# Patient Record
Sex: MALE | Race: WHITE | URBAN - METROPOLITAN AREA
[De-identification: names, ages, dates, MRNs, and addresses within clinical notes are randomized per-mention and may not be internally consistent; named-entity substitution may affect disease eponyms.]

---

## 2019-01-17 ENCOUNTER — HOSPITAL ENCOUNTER (EMERGENCY)
Age: 39
Discharge: STILL ADMITTED OR ADMINISTRATIVE | End: 2019-01-17

## 2021-04-12 ENCOUNTER — WALK IN (OUTPATIENT)
Dept: URGENT CARE | Age: 41
End: 2021-04-12

## 2021-04-12 VITALS
RESPIRATION RATE: 18 BRPM | HEART RATE: 106 BPM | OXYGEN SATURATION: 98 % | SYSTOLIC BLOOD PRESSURE: 177 MMHG | TEMPERATURE: 97 F | DIASTOLIC BLOOD PRESSURE: 116 MMHG

## 2021-04-12 DIAGNOSIS — Z20.828 CONTACT W AND EXPOSURE TO OTH VIRAL COMMUNICABLE DISEASES: ICD-10-CM

## 2021-04-12 DIAGNOSIS — R43.2 LOSS OF TASTE: Primary | ICD-10-CM

## 2021-04-12 LAB — SARS-COV+SARS-COV-2 AG RESP QL IA.RAPID: NOT DETECTED

## 2021-04-12 PROCEDURE — 87426 SARSCOV CORONAVIRUS AG IA: CPT | Performed by: FAMILY MEDICINE

## 2021-04-12 PROCEDURE — 99204 OFFICE O/P NEW MOD 45 MIN: CPT | Performed by: FAMILY MEDICINE

## 2024-03-18 ENCOUNTER — ORDER TRANSCRIPTION (OUTPATIENT)
Dept: SLEEP CENTER | Age: 44
End: 2024-03-18

## 2024-03-18 ENCOUNTER — TELEPHONE (OUTPATIENT)
Dept: SLEEP CENTER | Age: 44
End: 2024-03-18

## 2024-03-18 DIAGNOSIS — R06.83 SNORING: ICD-10-CM

## 2024-03-18 DIAGNOSIS — G47.33 OSA (OBSTRUCTIVE SLEEP APNEA): Primary | ICD-10-CM

## 2024-03-18 DIAGNOSIS — R06.81 APNEIC EPISODE: ICD-10-CM

## 2024-03-18 DIAGNOSIS — G47.8 NON-RESTORATIVE SLEEP: ICD-10-CM

## 2024-03-20 ENCOUNTER — OFFICE VISIT (OUTPATIENT)
Dept: SLEEP CENTER | Age: 44
End: 2024-03-20
Attending: Other
Payer: COMMERCIAL

## 2024-03-20 DIAGNOSIS — G47.8 NON-RESTORATIVE SLEEP: ICD-10-CM

## 2024-03-20 DIAGNOSIS — R06.81 APNEIC EPISODE: ICD-10-CM

## 2024-03-20 DIAGNOSIS — R06.83 SNORING: ICD-10-CM

## 2024-03-20 DIAGNOSIS — G47.33 OSA (OBSTRUCTIVE SLEEP APNEA): ICD-10-CM

## 2024-03-20 PROCEDURE — 95806 SLEEP STUDY UNATT&RESP EFFT: CPT

## 2024-03-21 ENCOUNTER — SLEEP STUDY (OUTPATIENT)
Facility: CLINIC | Age: 44
End: 2024-03-21
Payer: COMMERCIAL

## 2024-03-21 DIAGNOSIS — G47.9 SLEEP DISORDER: Primary | ICD-10-CM

## 2024-03-21 PROCEDURE — 95806 SLEEP STUDY UNATT&RESP EFFT: CPT | Performed by: OTHER

## 2024-03-23 ENCOUNTER — HOSPITAL ENCOUNTER (OUTPATIENT)
Dept: MRI IMAGING | Facility: HOSPITAL | Age: 44
Discharge: HOME | End: 2024-03-23
Attending: Other
Payer: COMMERCIAL

## 2024-03-23 DIAGNOSIS — R41.89 COGNITIVE CHANGES: ICD-10-CM

## 2024-03-23 DIAGNOSIS — R45.86 MOOD CHANGES: ICD-10-CM

## 2024-03-23 DIAGNOSIS — R51.9 NONINTRACTABLE HEADACHE, UNSPECIFIED CHRONICITY PATTERN, UNSPECIFIED HEADACHE TYPE: ICD-10-CM

## 2024-03-23 DIAGNOSIS — G47.9 SLEEPING DIFFICULTIES: ICD-10-CM

## 2024-03-23 DIAGNOSIS — S06.9X9S TRAUMATIC BRAIN INJURY WITH LOSS OF CONSCIOUSNESS, SEQUELA (HCC): ICD-10-CM

## 2024-03-23 PROCEDURE — 70551 MRI BRAIN STEM W/O DYE: CPT | Performed by: OTHER

## 2024-04-05 ENCOUNTER — TELEPHONE (OUTPATIENT)
Facility: CLINIC | Age: 44
End: 2024-04-05

## 2024-04-05 DIAGNOSIS — G47.33 OSA (OBSTRUCTIVE SLEEP APNEA): Primary | ICD-10-CM

## 2024-04-08 ENCOUNTER — OFFICE VISIT (OUTPATIENT)
Facility: CLINIC | Age: 44
End: 2024-04-08
Payer: COMMERCIAL

## 2024-04-08 VITALS
SYSTOLIC BLOOD PRESSURE: 168 MMHG | RESPIRATION RATE: 16 BRPM | WEIGHT: 254 LBS | OXYGEN SATURATION: 100 % | HEART RATE: 100 BPM | HEIGHT: 73 IN | DIASTOLIC BLOOD PRESSURE: 90 MMHG | BODY MASS INDEX: 33.66 KG/M2

## 2024-04-08 DIAGNOSIS — I10 PRIMARY HYPERTENSION: ICD-10-CM

## 2024-04-08 DIAGNOSIS — G47.33 OSA (OBSTRUCTIVE SLEEP APNEA): Primary | ICD-10-CM

## 2024-04-08 PROCEDURE — 3008F BODY MASS INDEX DOCD: CPT | Performed by: INTERNAL MEDICINE

## 2024-04-08 PROCEDURE — 3077F SYST BP >= 140 MM HG: CPT | Performed by: INTERNAL MEDICINE

## 2024-04-08 PROCEDURE — 99244 OFF/OP CNSLTJ NEW/EST MOD 40: CPT | Performed by: INTERNAL MEDICINE

## 2024-04-08 PROCEDURE — 3080F DIAST BP >= 90 MM HG: CPT | Performed by: INTERNAL MEDICINE

## 2024-04-08 RX ORDER — LOSARTAN POTASSIUM AND HYDROCHLOROTHIAZIDE 25; 100 MG/1; MG/1
1 TABLET ORAL DAILY
COMMUNITY
Start: 2024-03-17

## 2024-04-08 NOTE — PROGRESS NOTES
Pulmonary/Critical Care/Sleep Medicine    Consult Note     PCP: Ira Rodriguez MD   Phone: [phone]   Fax: [phone]     Ref Provider: No ref. provider found     Chief Complaint   Patient presents with    New Patient    Obstructive Sleep Apnea (ANA LUISA)     HST 3/20       HPI  I had the pleasure of seeing Lexx Patel who is a pleasant 44 year old male who presents for evaluation of ANA LUISA         The patient states that he has snored at home in past and was noted to have witnessed apnea , he was diagnosed with ANA LUISA on a home sleep study, so he presents for sleep evaluation.      He states bed time around 930-10  PM . It takes few min   to fall asleep and leaves bed around 6 AM. He wakes up sometimes 4 to 5 times a night.  He is mildly sleepy and fatigued during the daytime.  He admits to sleep talking BUT denies nightmares,  or sleep walking.  He admits to occasional  AM headaches.  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     The patient denies kicking legs at night. Denies teeth grinding.       He drinks no  caffeine coffee daily,         He has pets 1 dog and 1 cat that do not sleep in bed.         Hx of tobacco use: He  reports that he has never smoked. He has been exposed to tobacco smoke. He has never used smokeless tobacco.    Past Medical History:   Diagnosis Date    Acute pharyngitis     Back pain     Essential hypertension, benign       History reviewed. No pertinent surgical history.  No Known Allergies  Current Outpatient Medications   Medication Sig Dispense Refill    losartan-hydroCHLOROthiazide 100-25 MG Oral Tab Take 1 tablet by mouth daily.        Social History     Socioeconomic History    Marital status: Single   Occupational History    Occupation:      Comment: [employer]   Tobacco Use    Smoking status: Never     Passive exposure: Past    Smokeless tobacco: Never   Substance and Sexual  Activity    Alcohol use: Yes     Comment: Monthly; 2     Drug use: No   Other Topics Concern    Caffeine Concern Yes    Exercise Yes     Comment: Daily         There is no immunization history on file for this patient.   Family History   Problem Relation Age of Onset    Diabetes Mother     Other (cancer lung) Father 57    Diabetes Other         DM     Hypertension Other         Family history of     Cancer Other         Family history of         Review of Systems   Constitutional:  Positive for fatigue. Negative for fever and unexpected weight change.   HENT:  Negative for congestion, mouth sores, nosebleeds, postnasal drip, rhinorrhea, sore throat and trouble swallowing.    Eyes:  Negative for visual disturbance.   Respiratory:  Negative for apnea, cough, choking, chest tightness, shortness of breath and wheezing.    Cardiovascular:  Negative for chest pain, palpitations and leg swelling.   Gastrointestinal:  Negative for abdominal pain, constipation, diarrhea, nausea and vomiting.   Genitourinary:  Negative for difficulty urinating.   Musculoskeletal:  Negative for arthralgias, back pain, gait problem and myalgias.   Neurological:  Negative for dizziness, weakness and headaches.   Psychiatric/Behavioral:  Positive for sleep disturbance.         Vitals:    04/08/24 1043   BP: (!) 168/90   Pulse: 100   Resp: 16      SpO2: 100 %  Ht Readings from Last 1 Encounters:   04/08/24 6' 1\" (1.854 m)     Wt Readings from Last 1 Encounters:   04/08/24 254 lb (115.2 kg)     Body mass index is 33.51 kg/m².     Physical Exam  Constitutional:       General: He is not in acute distress.     Appearance: Normal appearance. He is not ill-appearing or diaphoretic.   HENT:      Head: Normocephalic and atraumatic.      Nose: Nose normal. No congestion or rhinorrhea.      Mouth/Throat:      Mouth: Mucous membranes are moist.      Pharynx: Oropharynx is clear. No oropharyngeal exudate or posterior oropharyngeal erythema.   Eyes:       Extraocular Movements: Extraocular movements intact.      Pupils: Pupils are equal, round, and reactive to light.   Cardiovascular:      Rate and Rhythm: Normal rate.      Pulses: Normal pulses.      Heart sounds: Normal heart sounds. No murmur heard.  Pulmonary:      Effort: Pulmonary effort is normal. No respiratory distress.      Breath sounds: Normal breath sounds. No wheezing or rhonchi.   Chest:      Chest wall: No tenderness.   Abdominal:      General: Abdomen is flat. Bowel sounds are normal.      Palpations: Abdomen is soft.   Musculoskeletal:         General: Normal range of motion.   Skin:     General: Skin is warm.   Neurological:      General: No focal deficit present.      Mental Status: He is alert and oriented to person, place, and time.   Psychiatric:         Mood and Affect: Mood normal.         Behavior: Behavior normal.         Thought Content: Thought content normal.         Judgment: Judgment normal.             Labs:  Last BMP  No results found for: \"GLU\", \"BUN\", \"CREATSERUM\", \"BUNCREA\", \"ANIONGAP\", \"GFRAA\", \"GFRNAA\", \"CA\", \"NA\", \"K\", \"CL\", \"CO2\", \"OSMOCALC\"   Last CBC  No results found for: \"WBC\", \"RBC\", \"HGB\", \"HCT\", \"MCV\", \"MCH\", \"MCHC\", \"RDW\", \"PLT\", \"MPV\"   Last CMP  No results found for: \"GLU\", \"BUN\", \"BUNCREA\", \"CREATSERUM\", \"ANIONGAP\", \"GFR\", \"GFRNAA\", \"GFRAA\", \"CA\", \"OSMOCALC\", \"ALKPHO\", \"AST\", \"ALT\", \"ALKPHOS\", \"BILT\", \"TP\", \"ALB\", \"GLOBULIN\", \"AGRATIO\", \"NA\", \"K\", \"CL\", \"CO2\"   Last Thyroid Function  No results found for: \"T4F\", \"TSH\", \"TSHT4\"     Imaging:  No results found.     [facility]       Accredited by the American Academy of Sleep Medicine (AASM)     PATIENT'S NAME:        MIGNON YAN  ATTENDING PHYSICIAN:   Jeana Elaine DO  REFERRING PHYSICIAN:   Jeana Elaine DO  PATIENT ACCOUNT #:     [id number]        LOCATION:       [facility] Center  MEDICAL RECORD #:      [id number]        YOB: 1980  DATE OF STUDY:         03/20/2024     SLEEP STUDY  REPORT     STUDY TYPE:  Unattended sleep study.     CLINICAL HISTORY:  This is a 44-year-old male with a body mass index of 30 who is undergoing evaluation for sleep-disordered breathing.  There is a positive history of snoring, witnessed apnea, hypersomnia.  The patient had an episode of not sleeping for 6 days around 3 weeks ago.       UNATTENDED SLEEP STUDY RECORDING PARAMETERS:  The patient underwent a formal technically adequate unattended diagnostic sleep study coordinated with the [facility].  The study was performed in accordance with the AASM standard for Out of Center Sleep Testing.  The four-channel Type III HST measures the following parameters:  flow, respiratory effort, pulse, and oxygen saturation.     SCORING:  This study was scored in accordance with AASM scoring rules and Medicare rule 1B.     FINDINGS:  The flow recording time began at 9:18 p.m. and ended at 5:54 a.m., for a duration of 8 hours and 32 minutes.  Severe snoring was recorded.  There was a total of 49 hypopneas and 354 apneas, for an overall apnea-hypopnea index of 47.2.  The supine index was 55.4, non-supine 43.3.  SaO2 juan jose was 78%.  Total time spent with oxyhemoglobin saturations less than 88% was 24 minutes.  Average pulse was 89.     IMPRESSION:  Overall very severe obstructive sleep apnea.  There was a central component identified as well.  SaO2 juan jose was 78%.       DIAGNOSIS:  Obstructive sleep apnea.     DIAGNOSTIC PLAN:    1.       At the request of the referring physician, we will confer with the patient regarding the results of the study and make treatment recommendations.   2.       Patient is a candidate for nasal CPAP, oral appliance therapy, and evaluation of the upper airway by ear, nose, throat specialist.   3.       Weight loss would likely have an ameliorating effect on the degree of sleep-disordered breathing identified, and weight loss is advised as appropriate.   4.       If daytime sleepiness is a  complaint, the patient needs to understand potential dangers associated with reduced daytime vigilance.   5.       Care should be used with the administration of anesthetic and sedative agents, and alcohol should be avoided.   6.       CPAP titration is strongly advised.     Thank you for your confidence in the [facility].  Please do not hesitate to contact us for any questions at [phone].     Dictated By Jeana Elaine DO  d:     04/01/2024 09:34:59    [place] Sleepiness Scale: (ESS) score on today's visit is 7  out of 24.     Score total of 1-6    Normal sleep   Score total of 7-8    Average sleepiness   Score total of 9-24    Abnormal (possibly pathologic) sleepiness       Impression:    Obstructive sleep apnea syndrome (OSAS): Home  sleep study performed on 3/20//2024 showed  overall apnea-hypopnea index of 47.2.  The supine index was 55.4, non-supine 43.3.  SaO2 juan jose was 78%.  Total time spent with oxyhemoglobin saturations less than 88% was 24 minutes.  Daytime hypersomnolence/fatigue  Obesity: Class I  ;  Body mass index is 33.51 kg/m².  Hypertension   Backache                               Plan:    Schedule CPAP titration sleep study to be interpreted by Dr. Scottie Keenan, will then arrange a NEW CPAP machine   Advised about weight loss   Advised against drowsy driving and to avoid alcoholic beverage and respiratory depressants as these may worsen sleep apnea      Follow up:  3 months     Thank you for allowing me to participate in your patient care.    MANDA Keenan MD, FACP, FCCP, FAA - Pulmonary/Critical care/Sleep Medicine  Please contact our office if you have any questions or concerns at [phone]

## 2024-04-08 NOTE — PATIENT INSTRUCTIONS
Plan:    Schedule CPAP titration sleep study to be interpreted by Dr. Scottie Keenan, will then arrange a NEW CPAP machine   Advised about weight loss   Advised against drowsy driving and to avoid alcoholic beverage and respiratory depressants as these may worsen sleep apnea      Follow up:  3 months     Scottie Keenan MD      Obstructive Sleep Apnea  Obstructive sleep apnea is a condition caused by air passages becoming narrowed or blocked during sleep. As a result, breathing stops for short periods. Your body wakes up enough for breathing to start again. But you don't remember it. The cycle of stopped breathing and brief awakenings can repeat dozens of times a night. This prevents the body from getting to the deeper stages of sleep that are needed for good rest.   Signs of sleep apnea include loud snoring, noisy breathing, and gasping sounds during sleep. People with sleep apnea often find they use the bathroom many times during the night. Daytime symptoms include waking up tired after a full night's sleep and waking up with headaches. They can also include feeling very sleepy or falling asleep during the day, and having problems with memory or concentration.   Risk factors for sleep apnea include:  Being overweight  Being assigned male at birth, or being in menopause  Smoking  Using alcohol or sedating medicines  Having enlarged structures in the nose or throat such as enlarged tonsils or adenoids, or extra tissue in the airway  Home care  Lifestyle changes that can help treat snoring and sleep apnea include:   If you're overweight, talk with your healthcare provider about a weight-loss plan for you.  Don't drink alcohol for 3 to 4 hours before bedtime.  Don't take sedating medicines. Ask your healthcare provider about the medicines you take.  If you smoke, talk to your provider about ways to quit. It's important to stay away from secondhand smoke. Don't use e-cigarettes because of their harmful side effects.  Sleep  on your side. This can help prevent gravity from pulling relaxed throat tissues into your breathing passages.  If you have allergies or sinus problems that block your nose, ask your provider for help.  Use positive airway pressure (PAP). Discuss with your provider the benefits of using PAP at home. And talk about the type of PAP that's best for you.  Follow-up care  Follow up with your healthcare provider, or as advised. A diagnosis of sleep apnea is made with a sleep study. Your provider can tell you more about this test.   When to get medical care  See your healthcare provider if you have daytime symptoms of sleep apnea. These include:   Waking up tired after a full night's sleep  Waking up with a headache  Feeling very sleepy or falling asleep during the day  Having problems with memory or concentration  Also talk with your provider if your partner tells you that you snore, gasp for air, or stop breathing while you sleep.   Seeing your provider is important because sleep apnea can make you more likely to have certain health problems. These include high blood pressure, heart attack, stroke, and sexual dysfunction. If you have sleep apnea, talk with your healthcare provider about the best treatments for you.   [employer] last reviewed this educational content on 5/1/2022 © 2000-2023 [employer]. All rights reserved. This information is not intended as a substitute for professional medical care. Always follow your healthcare professional's instructions.        Continuous Positive Air Pressure (CPAP)     A mask over the nose gently directs air into the throat to keep the airway open.     Continuous positive air pressure (CPAP) uses gentle air pressure to hold the airway open. CPAP is often the most effective treatment for sleep apnea. It works very well as a treatment for adults diagnosed with obstructive sleep apnea with a lot of sleepiness. But keep in mind that it can take several adjustments before  the setup is right for you.   How CPAP works  The CPAP machine  is a small portable pump that sits beside the bed. The pump sends air through a hose, which is held over your nose alone, or nose and mouth by a mask. Mild air pressure is gently pushed through your airway. The air pressure nudges sagging tissues aside. This widens the airway so you can breathe better. CPAP may be combined with other kinds of therapy for sleep apnea.   Types of air pressure treatments  There are different types of CPAP. Your doctor or CPAP technician will help you decide which type is best for you:   Basic CPAP keeps the pressure constant all night long.  A bilevel device (BiPAP) provides more pressure when you breathe in and less when you breathe out. A BiPAP machine also may be set to provide automatic breaths to maintain breathing if you stop breathing while sleeping.  An autoCPAP device automatically adjusts pressure throughout the night and in response to changes such as body position, sleep stage, and snoring.  [place] last reviewed this educational content on 7/1/2019  © 9816-2036 [employer]. All rights reserved. This information is not intended as a substitute for professional medical care. Always follow your healthcare professional's instructions.

## 2024-04-22 ENCOUNTER — TELEPHONE (OUTPATIENT)
Facility: CLINIC | Age: 44
End: 2024-04-22

## 2024-07-31 ENCOUNTER — MED REC SCAN ONLY (OUTPATIENT)
Facility: CLINIC | Age: 44
End: 2024-07-31

## 2024-10-30 ENCOUNTER — MED REC SCAN ONLY (OUTPATIENT)
Facility: CLINIC | Age: 44
End: 2024-10-30

## 2025-01-28 ENCOUNTER — MED REC SCAN ONLY (OUTPATIENT)
Facility: CLINIC | Age: 45
End: 2025-01-28

## 2025-04-30 ENCOUNTER — MED REC SCAN ONLY (OUTPATIENT)
Facility: CLINIC | Age: 45
End: 2025-04-30